# Patient Record
Sex: MALE | Race: WHITE | Employment: FULL TIME | URBAN - METROPOLITAN AREA
[De-identification: names, ages, dates, MRNs, and addresses within clinical notes are randomized per-mention and may not be internally consistent; named-entity substitution may affect disease eponyms.]

---

## 2021-03-31 ENCOUNTER — HOSPITAL ENCOUNTER (EMERGENCY)
Facility: CLINIC | Age: 21
Discharge: HOME | End: 2021-04-01
Attending: PHYSICIAN ASSISTANT | Admitting: PHYSICIAN ASSISTANT

## 2021-03-31 DIAGNOSIS — S61.412A LACERATION OF LEFT HAND, FOREIGN BODY PRESENCE UNSPECIFIED, INITIAL ENCOUNTER: ICD-10-CM

## 2021-03-31 PROCEDURE — 99283 EMERGENCY DEPT VISIT LOW MDM: CPT

## 2021-03-31 PROCEDURE — 12001 RPR S/N/AX/GEN/TRNK 2.5CM/<: CPT

## 2021-03-31 RX ORDER — LIDOCAINE HYDROCHLORIDE AND EPINEPHRINE 10; 10 MG/ML; UG/ML
INJECTION, SOLUTION INFILTRATION; PERINEURAL
Status: DISCONTINUED
Start: 2021-03-31 | End: 2021-04-01 | Stop reason: HOSPADM

## 2021-03-31 ASSESSMENT — ENCOUNTER SYMPTOMS: WOUND: 1

## 2021-04-01 VITALS
RESPIRATION RATE: 18 BRPM | OXYGEN SATURATION: 98 % | HEART RATE: 73 BPM | TEMPERATURE: 98.2 F | DIASTOLIC BLOOD PRESSURE: 65 MMHG | SYSTOLIC BLOOD PRESSURE: 121 MMHG

## 2021-04-01 NOTE — ED TRIAGE NOTES
Left hand, anterior medial laceration. Pinched between a pole and a cart. Not a clean straight cut. Bleeding controlled prior to arrival. ROM intact.

## 2021-04-01 NOTE — ED NOTES
Emergency Department Technician Wound Irrigation Note:    3/31/2021    11:36 PM      Wound location:  Lt side of palm     Irrigation Fluid: Normal Saline    Estimated Irrigation Volume (60 mL fluid per cm): 500 ml     Mary Houser

## 2021-05-28 ENCOUNTER — RECORDS - HEALTHEAST (OUTPATIENT)
Dept: ADMINISTRATIVE | Facility: CLINIC | Age: 21
End: 2021-05-28

## 2025-07-22 NOTE — ED PROVIDER NOTES
History     Chief Complaint:  Laceration     HPI:   James Watt is a 20 year old male who presents with left hand pain. He crushed his left hand between a pole and a cart while at work ([employer]). He sustained a laceration to the lateral aspect of the left hand. Bleeding controlled prior to arrival. last tetanus 2012.    Review of Systems   Skin: Positive for wound.   All other systems reviewed and are negative.    Allergies:  No Known Allergies     Medications:    Albuterol inhaler    Past Medical History:    Asthma      Social history:  Patient presents alone.  Patient works at [employer].    Physical Exam     Vitals:  Patient Vitals for the past 24 hrs:   BP Temp Temp src Pulse Resp SpO2   04/01/21 0010 121/65 98.2  F (36.8  C) Oral 73 18 98 %   03/31/21 2222 122/69 98.1  F (36.7  C) Oral 75 18 99 %       Physical Exam:  HEENT: mmm  Eyes: PERRL B/L  Neck: Supple  CV: Peripheral pulses intact and regular  Resp: Speaking in full sentences without any respiratory distress  Ext:  Left Hand  No bony TTP.  2.5cm macerated laceration overlying medial aspect of palm, mid 5th metacarpal  Able to fire Hand/finger flexors and extensors.  There is normal strength against resistance  Sensation and perfusion intact throughout hand  Remainder of the skeletal survey is unremarkable  Skin: warm dry well perfused  Neuro: Alert  Nursing notes and vital signs reviewed.    Emergency Department Course     Procedures:    Laceration Repair        LACERATION:  A macerated minimally Contaminated 2.5 cm laceration.      LOCATION:  medial aspect of palm, mid 5th metacarpal      FUNCTION:  Distally sensation, circulation, motor and tendon function are intact.      ANESTHESIA:  Local using 1% lidocaine with epi total of 2 mLs      PREPARATION:  Irrigation and Scrubbing with Normal Saline and Shur Clens      DEBRIDEMENT:  wound explored, no foreign body found      CLOSURE:  Wound was closed with One Layer.  Skin closed with 7 x 4.0 Ethylon using  interrupted sutures.    Emergency Department Course:  Reviewed:  Past medical records, Care Everywhere, nursing notes, and vitals reviewed.     Assessments:  2318 I performed an exam of the patient and obtained history, as documented above.  Performed laceration repair as above    Disposition:  The patient was discharged to home.     Impression & Plan     Medical Decision Making:   Jmaes Watt is a 20 year old male who presents for evaluation of a laceration to the left hand.  The wound was carefully evaluated and explored.  The laceration was closed as noted above.  There is no evidence of muscular, tendon, or bony damage with this laceration.  No signs of foreign body.  Possible complications (infection, scarring) were reviewed with the patient.  Offered updating tetanus as last update was 2012, however patient declined at this time. Catherine patient was safe for discharge to home. Discussed reasons to return. All questions answered. Patient discharged to home in stable condition.    Diagnosis:    ICD-10-CM    1. Laceration of left hand, foreign body presence unspecified, initial encounter  S61.412A         Discharge Medications:  None    Scribe Disclosure:  I, Twila Sumner, am serving as a scribe at 11:18 PM on 3/31/2021 to document services personally performed by Kirill Carlisle PA-C based on my observations and the provider's statements to me.       Twila Sumner  3/31/2021    This record was created at least in part using electronic voice recognition software, so please excuse any typographical errors.       Kirill Carlisle PA-C  04/01/21 0124     complains of pain/discomfort